# Patient Record
Sex: MALE | Employment: UNEMPLOYED | URBAN - METROPOLITAN AREA
[De-identification: names, ages, dates, MRNs, and addresses within clinical notes are randomized per-mention and may not be internally consistent; named-entity substitution may affect disease eponyms.]

---

## 2017-09-27 PROBLEM — Z99.2: Status: ACTIVE | Noted: 2017-09-27

## 2017-09-27 PROBLEM — I12.0 HYPERTENSIVE CKD, ESRD ON DIALYSIS (HCC): Status: ACTIVE | Noted: 2017-09-27

## 2017-09-27 PROBLEM — N18.6 HYPERTENSIVE CKD, ESRD ON DIALYSIS (HCC): Status: ACTIVE | Noted: 2017-09-27

## 2017-09-27 PROBLEM — Z99.2 HYPERTENSIVE CKD, ESRD ON DIALYSIS (HCC): Status: ACTIVE | Noted: 2017-09-27

## 2018-05-01 ENCOUNTER — OFFICE VISIT (OUTPATIENT)
Dept: INFECTIOUS DISEASES | Age: 78
End: 2018-05-01
Payer: MEDICARE

## 2018-05-01 VITALS
OXYGEN SATURATION: 94 % | DIASTOLIC BLOOD PRESSURE: 43 MMHG | WEIGHT: 248 LBS | TEMPERATURE: 98 F | RESPIRATION RATE: 16 BRPM | HEIGHT: 71 IN | BODY MASS INDEX: 34.72 KG/M2 | HEART RATE: 58 BPM | SYSTOLIC BLOOD PRESSURE: 136 MMHG

## 2018-05-01 DIAGNOSIS — J44.9 CHRONIC OBSTRUCTIVE PULMONARY DISEASE, UNSPECIFIED COPD TYPE (HCC): ICD-10-CM

## 2018-05-01 DIAGNOSIS — N18.6 HYPERTENSIVE CKD, ESRD ON DIALYSIS (HCC): ICD-10-CM

## 2018-05-01 DIAGNOSIS — I12.0 HYPERTENSIVE CKD, ESRD ON DIALYSIS (HCC): ICD-10-CM

## 2018-05-01 DIAGNOSIS — Z99.2 HYPERTENSIVE CKD, ESRD ON DIALYSIS (HCC): ICD-10-CM

## 2018-05-01 DIAGNOSIS — A41.1: Primary | ICD-10-CM

## 2018-05-01 PROCEDURE — G8417 CALC BMI ABV UP PARAM F/U: HCPCS | Performed by: INTERNAL MEDICINE

## 2018-05-01 PROCEDURE — 1123F ACP DISCUSS/DSCN MKR DOCD: CPT | Performed by: INTERNAL MEDICINE

## 2018-05-01 PROCEDURE — 99205 OFFICE O/P NEW HI 60 MIN: CPT | Performed by: INTERNAL MEDICINE

## 2018-05-01 PROCEDURE — G8427 DOCREV CUR MEDS BY ELIG CLIN: HCPCS | Performed by: INTERNAL MEDICINE

## 2018-05-01 PROCEDURE — G8926 SPIRO NO PERF OR DOC: HCPCS | Performed by: INTERNAL MEDICINE

## 2018-05-01 PROCEDURE — 4040F PNEUMOC VAC/ADMIN/RCVD: CPT | Performed by: INTERNAL MEDICINE

## 2018-05-01 PROCEDURE — 3023F SPIROM DOC REV: CPT | Performed by: INTERNAL MEDICINE

## 2018-05-01 PROCEDURE — 1036F TOBACCO NON-USER: CPT | Performed by: INTERNAL MEDICINE

## 2018-05-01 RX ORDER — MIDODRINE HYDROCHLORIDE 5 MG/1
5 TABLET ORAL DAILY
COMMUNITY
End: 2020-03-24

## 2018-05-01 RX ORDER — FAMOTIDINE 40 MG/1
40 TABLET, FILM COATED ORAL DAILY
COMMUNITY
End: 2019-08-08 | Stop reason: ALTCHOICE

## 2018-05-01 RX ORDER — LOVASTATIN 20 MG/1
1 TABLET ORAL DAILY
Refills: 10 | COMMUNITY
Start: 2018-04-23

## 2018-05-01 RX ORDER — TAMSULOSIN HYDROCHLORIDE 0.4 MG/1
0.4 CAPSULE ORAL DAILY
COMMUNITY

## 2018-05-01 RX ORDER — HYDRALAZINE HYDROCHLORIDE 25 MG/1
25 TABLET, FILM COATED ORAL 3 TIMES DAILY
COMMUNITY
End: 2019-08-08

## 2018-05-01 RX ORDER — ALBUTEROL SULFATE 90 UG/1
2 AEROSOL, METERED RESPIRATORY (INHALATION) EVERY 6 HOURS PRN
COMMUNITY

## 2018-05-01 RX ORDER — FENOFIBRATE 145 MG/1
1 TABLET, COATED ORAL DAILY
COMMUNITY
Start: 2018-04-09 | End: 2019-02-18

## 2018-05-01 RX ORDER — ASPIRIN 81 MG/1
1 TABLET, CHEWABLE ORAL DAILY
COMMUNITY

## 2018-05-01 RX ORDER — ALFUZOSIN HYDROCHLORIDE 10 MG/1
10 TABLET, EXTENDED RELEASE ORAL DAILY
COMMUNITY
Start: 2017-10-03

## 2018-05-01 RX ORDER — WARFARIN SODIUM 4 MG/1
4 TABLET ORAL
COMMUNITY

## 2018-05-01 RX ORDER — SEVELAMER CARBONATE 800 MG/1
1 TABLET, FILM COATED ORAL
COMMUNITY
End: 2019-08-08

## 2018-05-01 RX ORDER — LEVOCETIRIZINE DIHYDROCHLORIDE 5 MG/1
5 TABLET, FILM COATED ORAL DAILY
COMMUNITY
Start: 2018-01-03

## 2018-05-01 RX ORDER — IPRATROPIUM BROMIDE 42 UG/1
2 SPRAY, METERED NASAL DAILY
COMMUNITY

## 2018-05-01 RX ORDER — MOMETASONE FUROATE 50 UG/1
2 SPRAY, METERED NASAL DAILY
COMMUNITY
End: 2019-08-08

## 2018-05-01 RX ORDER — MONTELUKAST SODIUM 10 MG/1
10 TABLET ORAL NIGHTLY
COMMUNITY

## 2018-07-03 ENCOUNTER — OFFICE VISIT (OUTPATIENT)
Dept: INFECTIOUS DISEASES | Age: 78
End: 2018-07-03
Payer: MEDICARE

## 2018-07-03 VITALS
TEMPERATURE: 96.8 F | HEART RATE: 81 BPM | BODY MASS INDEX: 37.38 KG/M2 | WEIGHT: 267 LBS | DIASTOLIC BLOOD PRESSURE: 69 MMHG | SYSTOLIC BLOOD PRESSURE: 145 MMHG | HEIGHT: 71 IN

## 2018-07-03 DIAGNOSIS — R78.81 COAG NEGATIVE STAPHYLOCOCCUS BACTEREMIA: Primary | ICD-10-CM

## 2018-07-03 DIAGNOSIS — T82.7XXD PACEMAKER INFECTION, SUBSEQUENT ENCOUNTER: ICD-10-CM

## 2018-07-03 DIAGNOSIS — B95.7 COAG NEGATIVE STAPHYLOCOCCUS BACTEREMIA: Primary | ICD-10-CM

## 2018-07-03 DIAGNOSIS — I35.8 ENDOCARDITIS OF AORTIC VALVE: ICD-10-CM

## 2018-07-03 PROCEDURE — 99214 OFFICE O/P EST MOD 30 MIN: CPT | Performed by: INTERNAL MEDICINE

## 2018-07-03 PROCEDURE — 1123F ACP DISCUSS/DSCN MKR DOCD: CPT | Performed by: INTERNAL MEDICINE

## 2018-07-03 PROCEDURE — 1101F PT FALLS ASSESS-DOCD LE1/YR: CPT | Performed by: INTERNAL MEDICINE

## 2018-07-03 PROCEDURE — G8417 CALC BMI ABV UP PARAM F/U: HCPCS | Performed by: INTERNAL MEDICINE

## 2018-07-03 PROCEDURE — 1036F TOBACCO NON-USER: CPT | Performed by: INTERNAL MEDICINE

## 2018-07-03 PROCEDURE — 4040F PNEUMOC VAC/ADMIN/RCVD: CPT | Performed by: INTERNAL MEDICINE

## 2018-07-03 PROCEDURE — G8427 DOCREV CUR MEDS BY ELIG CLIN: HCPCS | Performed by: INTERNAL MEDICINE

## 2018-07-03 NOTE — PROGRESS NOTES
antimicrobial therapy. If those blood cultures come back negative, he will likely be able to go for pacemaker device which is tentatively schedule on August 8. We will follow him post pacer device implantation. Thank you. F/u of : Bacteremia/endocarditis  Chief Complaint   Patient presents with    Frequent Infections     Septicemia post [facility]     Interval History:  Sandrita Han is a 65y.o.-year-old male who was initially admitted on 5/17/2018. Haylee Jones has a medical history significant for anxiety disorder, osteoarthritis, asthma, BPH, congestive heart failure, COPD, chronic back pain, GERD, history of glaucoma, hearing loss, dyslipidemia, obesity, end-stage renal disease on hemodialysis, and status post AV fistula creation.     Mr. Koroma [address] 20. has been admitted since May 17. He was diagnosed with the coagulase-negative Staph/methicillin sensitive Staph septicemia that has been relapsing. Surface echocardiogram showed a mobile density in the damaged aortic valve and his dual-chamber pacemaker was removed on May 18.     The patient went on hemodialysis in September 2017. He has been using a tunneled catheter in his right chest.  He said the catheter was replaced once because it was malfunctioning. He claims that he has had at least 3 bouts of blood stream infection and has been getting vancomycin during dialysis. There was 1 other time he had a positive blood culture although it is not in her system where he was treated with intravenous antibiotics but the catheter was not removed. He had another bloodstream infection in April 21 noted in epic microbiology report. His catheter was removed and luckily at that time his AV fistula was working hands days which his access to these the functioning AV F. he finishes antimicrobial therapy on May 8, 2018. on May 16 he had intermittent fever and was diagnosed to have a relapse of his coagulase-negative Staph septicemia.   As warfarin (COUMADIN) 4 MG tablet, Take 4 mg by mouth, Disp: , Rfl:     LORazepam (ATIVAN) 0.5 MG tablet, Take 0.5 mg by mouth nightly, Disp: , Rfl:     diphenhydrAMINE (BENADRYL) 25 MG capsule, Take 50 mg by mouth every 6 hours as needed for Itching, Allergies or Sleep, Disp: , Rfl:     Past Medical History:   Diagnosis Date    Acute kidney insufficiency     Anxiety     Borderline diabetes     [facility]D [facility] ([facility]     Hyperlipidemia     Hypertension     Insomnia     Positive blood cultures     Staphylococcus Epidermidis. Unknown source        Past Surgical History:   Procedure Laterality Date    CATARACT REMOVAL Bilateral     COLONOSCOPY         Allergies   Allergen Reactions    Penicillins     Zocor [Simvastatin] Rash       History reviewed. No pertinent family history. Social History     Social History    Marital status:      Spouse name: N/A    Number of children: N/A    Years of education: N/A     Social History Main Topics    Smoking status: Former Smoker    Smokeless tobacco: Never Used    Alcohol use No    Drug use: No    Sexual activity: Not Asked     Other Topics Concern    None     Social History Narrative    None         Objective:   BP (!) 145/69 (Site: Left Arm) Comment: pt ok with first reading  Pulse 81   Temp 96.8 °F (36 °C)   Ht 5' 11\" (1.803 m)   Wt 267 lb (121.1 kg)   BMI 37.24 kg/m²   Physical Exam   Constitutional: He is oriented to person, place, and time. He appears well-developed and well-nourished. No distress. HENT:   Head: Normocephalic and atraumatic. Mouth/Throat: No oropharyngeal exudate. Eyes: Pupils are equal, round, and reactive to light. EOM are normal. Right eye exhibits no discharge. Left eye exhibits no discharge. No scleral icterus. Mildly pale conjunctivae. Neck: Neck supple. No JVD present. No tracheal deviation present. Cardiovascular: Normal rate, regular rhythm, normal heart sounds and intact distal pulses.     No murmur Recent imaging studies at [facility] reviewed. Problem List:  Patient Active Problem List   Diagnosis    Encounter for continuous ambulatory peritoneal dialysis ([facility]    Hypertensive CKD, ESRD on dialysis ([facility]             Thank you.     Pretty Membreno MD  7/3/2018  2:50 PM    Infectious Disease Medicine

## 2018-07-03 NOTE — LETTER
once because it was malfunctioning. He claims that he has had at least 3 bouts of blood stream infection and has been getting vancomycin during dialysis. There was 1 other time he had a positive blood culture although it is not in her system where he was treated with intravenous antibiotics but the catheter was not removed. He had another bloodstream infection in April 21 noted in epic microbiology report. His catheter was removed and luckily at that time his AV fistula was working hands days which his access to these the functioning AV F. he finishes antimicrobial therapy on May 8, 2018. on May 16 he had intermittent fever and was diagnosed to have a relapse of his coagulase-negative Staph septicemia. As mentioned, the dual-chamber pacemaker has been explanted   On May 18. Both the atrial and ventricular leads came back with coagulase-negative staph. The isolate was methicillin sensitive. The patient underwent a mitral valve and aortic valve replacement on May 25 by . We discharged him on intravenous cefazolin because isolate was methicillin sensitive. Interval history:    The patient seems to be doing well at this point. He had his aortic valve and mitral valve replaced  On May 25 and his pacemaker explanted on May 18. He has an external pacing device which seems to be not bothering him. Exit site appears to be unremarkable. He has penicillin allergy but he seems to be tolerating the antibiotic with out any problems. He has not developed any skin rash, or any mucosal swelling. He goes to dialysis regularly. He denies having any chest pain or progressive shortness of breath. He does have some shortness of breath along distance walk. His appetite is slowly improving. His strength seems to be improving. We are here to evaluate him for follow-up prior to reimplantation of his pacemaker device. He is finishing up his antibiotics on July 16, 2018. BP (!) 145/69 (Site: Left Arm) Comment: pt ok with first reading  Pulse 81   Temp 96.8 °F (36 °C)   Ht 5' 11\" (1.803 m)   Wt 267 lb (121.1 kg)   BMI 37.24 kg/m²    Physical Exam   Constitutional: He is oriented to person, place, and time. He appears well-developed and well-nourished. No distress. HENT:   Head: Normocephalic and atraumatic. Mouth/Throat: No oropharyngeal exudate. Eyes: Pupils are equal, round, and reactive to light. EOM are normal. Right eye exhibits no discharge. Left eye exhibits no discharge. No scleral icterus. Mildly pale conjunctivae. Neck: Neck supple. No JVD present. No tracheal deviation present. Cardiovascular: Normal rate, regular rhythm, normal heart sounds and intact distal pulses. No murmur heard. Pulmonary/Chest: Effort normal and breath sounds normal. No stridor. He has no wheezes. He has no rales. External pacer device is unremarkable. Dressing intact without any drainage. Sternal wound appears to be well healed and approximated at this point. Abdominal: Soft. Bowel sounds are normal. He exhibits no distension. There is no tenderness. Musculoskeletal: He exhibits no edema. AV fistula with good thrill. Lymphadenopathy:     He has no cervical adenopathy. Neurological: He is alert and oriented to person, place, and time. Skin: He is not diaphoretic. Psychiatric: He has a normal mood and affect. His behavior is normal. Judgment and thought content normal.              Data Review:      Recent blood work and diagnostics at [facility] reviewed. MICRO:  Newer results are available. Click to view them now. Ref Range & Units 5/18/18 2018 5/18/18 2017 5/18/18 2017 5/18/18 2015 5/18/18 2015    Specimen description Y OTHER:  OTHER:  OTHER:  OTHER:  OTHER:    Special request  ATRIAL LEAD  ATRIAL LEAD  ATRIAL LEAD  VENTRICULAR LEAD  VENTRICULAR LEAD     Fungal smear  NO FUNGAL ELEMENTS S... NO FUNGAL ELEMENTS S...

## 2018-09-04 ENCOUNTER — OFFICE VISIT (OUTPATIENT)
Dept: INFECTIOUS DISEASES | Age: 78
End: 2018-09-04
Payer: MEDICARE

## 2018-09-04 VITALS
SYSTOLIC BLOOD PRESSURE: 144 MMHG | TEMPERATURE: 96.7 F | WEIGHT: 215 LBS | HEART RATE: 66 BPM | BODY MASS INDEX: 30.1 KG/M2 | HEIGHT: 71 IN | DIASTOLIC BLOOD PRESSURE: 68 MMHG

## 2018-09-04 DIAGNOSIS — A41.2 STAPHYLOCOCCAL SEPSIS (HCC): ICD-10-CM

## 2018-09-04 DIAGNOSIS — I35.8 ENDOCARDITIS OF AORTIC VALVE: Primary | ICD-10-CM

## 2018-09-04 PROCEDURE — 99213 OFFICE O/P EST LOW 20 MIN: CPT | Performed by: INTERNAL MEDICINE

## 2018-09-04 PROCEDURE — 1123F ACP DISCUSS/DSCN MKR DOCD: CPT | Performed by: INTERNAL MEDICINE

## 2018-09-04 PROCEDURE — 1036F TOBACCO NON-USER: CPT | Performed by: INTERNAL MEDICINE

## 2018-09-04 PROCEDURE — G8428 CUR MEDS NOT DOCUMENT: HCPCS | Performed by: INTERNAL MEDICINE

## 2018-09-04 PROCEDURE — 1101F PT FALLS ASSESS-DOCD LE1/YR: CPT | Performed by: INTERNAL MEDICINE

## 2018-09-04 PROCEDURE — 4040F PNEUMOC VAC/ADMIN/RCVD: CPT | Performed by: INTERNAL MEDICINE

## 2018-09-04 PROCEDURE — G8417 CALC BMI ABV UP PARAM F/U: HCPCS | Performed by: INTERNAL MEDICINE

## 2018-09-04 NOTE — LETTER
[facility] [address] [place] [address].  [address] [facility],5[address] [address]  Phone: [phone]  Fax: [phone]    PCP: Myriam BARRIOS providers:  Myriam Kirkland MD  [address] [id number]  [facility] [address]  VIA Mail       December 9, 2018       Patient: Shruthi Potter   MR Number: [id number]   YOB: 1940   Date of Visit: 9/4/2018     Dear Aicha Juarez: Thank you for allowing me to see your patient Mr. Shruthi Potetr. Below are the relevant portions of my assessment and plan of care. [facility] INFECTIOUS DISEASES  Follow-Up Note      Patient's Name: Shruthi Potter  YOB: 1940  Age/Sex: 65 y.o./ male  Physician: Annie Rainey MD  Date of Consult: 9/4/2018          Assessment:     · Aortic valve endocarditis Status post # 25 mm Avalus AVR and #31 most take MVR, left atrial appendage resection May 25, 2018  ·   Postoperative shock resolved  · Methicillin sensitive coagulase-negative Staph septicemia on April 21 initially thought to be related to a dialysis catheter that was removed.  Allegedly, he had another blood from infection of the same organism several months prior to the infection in April 21.  · Recurrent/intermittent fever with relapse of methicillin sensitive Staph septicemia  on May 16, 2018  · Coag negative staph and bacillus species positive culture for atrial lead and ventricular lead. · Status post explantation/removal of RV and RA pacing leads on May 18. ·  Status post reimplantation of a [employer] pacemaker and removal external pacemaker on August 9, 2018. · Status post dual-chamber pacemaker placed in November 2017 for sinus node dysfunction and polymorphic ventricular tachycardia.   · End-stage renal disease on hemodialysis  · Congestive heart failure  · COPD  · GERD  · Obesity  · Dyslipidemia  · Anxiety disorder  · Asthma  · BPH  · Osteoarthritis  · Benign essential tremor  · Paresthesias of both feet unremarkable without any overt infection. He is off antimicrobial therapy. He denies having any chills, fever or sweats. His appetite is improving as well as his energy level. He has occasional shortness of breath on long distance walked but denies having any chest pain or abdominal pain or any diarrhea. Subjective:     Review of Systems   Constitutional: Negative for activity change, chills and fever. HENT: Negative for congestion, facial swelling, hearing loss, rhinorrhea, sore throat, trouble swallowing and voice change. Eyes: Negative for discharge and visual disturbance. Respiratory: Negative for cough and shortness of breath (Only on long distance walked). Cardiovascular: Negative for chest pain and leg swelling. Gastrointestinal: Negative for abdominal distention, abdominal pain, diarrhea and nausea. Genitourinary: Negative for hematuria. Musculoskeletal: Negative for arthralgias, back pain, gait problem, joint swelling, myalgias, neck pain and neck stiffness. Skin: Positive for wound (Recent wound from a pacemaker placemenhealing. ). Allergic/Immunologic: Negative for environmental allergies and food allergies. Neurological: Negative for dizziness, weakness, numbness and headaches. Hematological: Does not bruise/bleed easily.          Current Outpatient Prescriptions:     B Complex-C-Folic Acid (TRIPHROCAPS) 1 MG CAPS, TAKE 1 CAPSULE BY MOUTH ONE TIME A DAY , Disp: 30 capsule, Rfl: 5    B Complex-C-Folic Acid (TRIPHROCAPS) 1 MG CAPS, TAKE 1 CAPSULE BY MOUTH ONE TIME A DAY , Disp: 30 capsule, Rfl: 0    alfuzosin (UROXATRAL) 10 MG extended release tablet, Take 10 mg by mouth daily, Disp: , Rfl:     aspirin 81 MG chewable tablet, Take 1 tablet by mouth daily, Disp: , Rfl:     famotidine (PEPCID) 40 MG tablet, Take 40 mg by mouth daily, Disp: , Rfl:     fenofibrate (TRICOR) 145 MG tablet, Take 1 tablet by mouth daily, Disp: , Rfl:

## 2018-12-09 ASSESSMENT — ENCOUNTER SYMPTOMS
ABDOMINAL DISTENTION: 0
BACK PAIN: 0
FACIAL SWELLING: 0
ABDOMINAL DISTENTION: 0
VOICE CHANGE: 0
NAUSEA: 0
COUGH: 0
SINUS PRESSURE: 0
BACK PAIN: 0
EYE DISCHARGE: 0
TROUBLE SWALLOWING: 0
SORE THROAT: 0
SHORTNESS OF BREATH: 1
SHORTNESS OF BREATH: 0
SINUS PAIN: 0
COUGH: 0
RHINORRHEA: 0
EYE DISCHARGE: 0
ABDOMINAL PAIN: 0
FACIAL SWELLING: 0
VOICE CHANGE: 0
ABDOMINAL PAIN: 0
TROUBLE SWALLOWING: 0
DIARRHEA: 0
SORE THROAT: 0
DIARRHEA: 0
NAUSEA: 0
RHINORRHEA: 0

## 2019-02-05 ENCOUNTER — TELEPHONE (OUTPATIENT)
Dept: GASTROENTEROLOGY | Age: 79
End: 2019-02-05

## 2019-02-06 ENCOUNTER — TELEPHONE (OUTPATIENT)
Dept: GASTROENTEROLOGY | Age: 79
End: 2019-02-06

## 2019-02-18 ENCOUNTER — OFFICE VISIT (OUTPATIENT)
Dept: GASTROENTEROLOGY | Age: 79
End: 2019-02-18
Payer: MEDICARE

## 2019-02-18 VITALS — WEIGHT: 217.5 LBS | BODY MASS INDEX: 30.34 KG/M2 | DIASTOLIC BLOOD PRESSURE: 61 MMHG | SYSTOLIC BLOOD PRESSURE: 133 MMHG

## 2019-02-18 DIAGNOSIS — K63.5 POLYP OF COLON, UNSPECIFIED PART OF COLON, UNSPECIFIED TYPE: Primary | ICD-10-CM

## 2019-02-18 PROCEDURE — 1123F ACP DISCUSS/DSCN MKR DOCD: CPT | Performed by: INTERNAL MEDICINE

## 2019-02-18 PROCEDURE — 4040F PNEUMOC VAC/ADMIN/RCVD: CPT | Performed by: INTERNAL MEDICINE

## 2019-02-18 PROCEDURE — G8427 DOCREV CUR MEDS BY ELIG CLIN: HCPCS | Performed by: INTERNAL MEDICINE

## 2019-02-18 PROCEDURE — 1101F PT FALLS ASSESS-DOCD LE1/YR: CPT | Performed by: INTERNAL MEDICINE

## 2019-02-18 PROCEDURE — 99213 OFFICE O/P EST LOW 20 MIN: CPT | Performed by: INTERNAL MEDICINE

## 2019-02-18 PROCEDURE — G8484 FLU IMMUNIZE NO ADMIN: HCPCS | Performed by: INTERNAL MEDICINE

## 2019-02-18 PROCEDURE — G8417 CALC BMI ABV UP PARAM F/U: HCPCS | Performed by: INTERNAL MEDICINE

## 2019-02-18 PROCEDURE — 1036F TOBACCO NON-USER: CPT | Performed by: INTERNAL MEDICINE

## 2019-02-21 ENCOUNTER — TELEPHONE (OUTPATIENT)
Dept: GASTROENTEROLOGY | Age: 79
End: 2019-02-21

## 2019-02-26 RX ORDER — PEG-3350, SODIUM SULFATE, SODIUM CHLORIDE, POTASSIUM CHLORIDE, SODIUM ASCORBATE AND ASCORBIC ACID 7.5-2.691G
100 KIT ORAL ONCE
Qty: 1 BOTTLE | Refills: 0 | Status: SHIPPED | OUTPATIENT
Start: 2019-02-26 | End: 2019-02-26

## 2019-04-08 ENCOUNTER — TELEPHONE (OUTPATIENT)
Dept: GASTROENTEROLOGY | Age: 79
End: 2019-04-08

## 2019-04-15 ENCOUNTER — ANESTHESIA EVENT (OUTPATIENT)
Dept: OPERATING ROOM | Age: 79
End: 2019-04-15
Payer: MEDICARE

## 2019-04-16 ENCOUNTER — HOSPITAL ENCOUNTER (OUTPATIENT)
Age: 79
Setting detail: OUTPATIENT SURGERY
Discharge: HOME | End: 2019-04-16
Attending: INTERNAL MEDICINE | Admitting: INTERNAL MEDICINE
Payer: MEDICARE

## 2019-04-16 ENCOUNTER — ANESTHESIA (OUTPATIENT)
Dept: OPERATING ROOM | Age: 79
End: 2019-04-16
Payer: MEDICARE

## 2019-04-16 VITALS
DIASTOLIC BLOOD PRESSURE: 60 MMHG | OXYGEN SATURATION: 98 % | RESPIRATION RATE: 23 BRPM | SYSTOLIC BLOOD PRESSURE: 128 MMHG

## 2019-04-16 VITALS
DIASTOLIC BLOOD PRESSURE: 51 MMHG | OXYGEN SATURATION: 98 % | HEIGHT: 71 IN | HEART RATE: 63 BPM | BODY MASS INDEX: 28.64 KG/M2 | TEMPERATURE: 97.7 F | WEIGHT: 204.59 LBS | SYSTOLIC BLOOD PRESSURE: 154 MMHG | RESPIRATION RATE: 16 BRPM

## 2019-04-16 LAB
INR BLD: 1.3
POTASSIUM SERPL-SCNC: 4.4 MMOL/L (ref 3.7–5.3)
PROTHROMBIN TIME: 12.8 SEC (ref 9.7–11.6)

## 2019-04-16 PROCEDURE — 3609010700 HC COLONOSCOPY POLYPECTOMY REMOVAL SNARE/STOMA: Performed by: INTERNAL MEDICINE

## 2019-04-16 PROCEDURE — 84132 ASSAY OF SERUM POTASSIUM: CPT

## 2019-04-16 PROCEDURE — 2709999900 HC NON-CHARGEABLE SUPPLY: Performed by: INTERNAL MEDICINE

## 2019-04-16 PROCEDURE — 7100000011 HC PHASE II RECOVERY - ADDTL 15 MIN: Performed by: INTERNAL MEDICINE

## 2019-04-16 PROCEDURE — 2580000003 HC RX 258: Performed by: ANESTHESIOLOGY

## 2019-04-16 PROCEDURE — 6360000002 HC RX W HCPCS: Performed by: NURSE ANESTHETIST, CERTIFIED REGISTERED

## 2019-04-16 PROCEDURE — 3700000001 HC ADD 15 MINUTES (ANESTHESIA): Performed by: INTERNAL MEDICINE

## 2019-04-16 PROCEDURE — 2500000003 HC RX 250 WO HCPCS: Performed by: ANESTHESIOLOGY

## 2019-04-16 PROCEDURE — 3700000000 HC ANESTHESIA ATTENDED CARE: Performed by: INTERNAL MEDICINE

## 2019-04-16 PROCEDURE — 45385 COLONOSCOPY W/LESION REMOVAL: CPT | Performed by: INTERNAL MEDICINE

## 2019-04-16 PROCEDURE — 88305 TISSUE EXAM BY PATHOLOGIST: CPT

## 2019-04-16 PROCEDURE — 85610 PROTHROMBIN TIME: CPT

## 2019-04-16 PROCEDURE — 7100000010 HC PHASE II RECOVERY - FIRST 15 MIN: Performed by: INTERNAL MEDICINE

## 2019-04-16 PROCEDURE — 45380 COLONOSCOPY AND BIOPSY: CPT | Performed by: INTERNAL MEDICINE

## 2019-04-16 RX ORDER — SODIUM CHLORIDE 0.9 % (FLUSH) 0.9 %
10 SYRINGE (ML) INJECTION PRN
Status: DISCONTINUED | OUTPATIENT
Start: 2019-04-16 | End: 2019-04-16 | Stop reason: HOSPADM

## 2019-04-16 RX ORDER — SODIUM CHLORIDE 0.9 % (FLUSH) 0.9 %
10 SYRINGE (ML) INJECTION EVERY 12 HOURS SCHEDULED
Status: DISCONTINUED | OUTPATIENT
Start: 2019-04-16 | End: 2019-04-16 | Stop reason: HOSPADM

## 2019-04-16 RX ORDER — SODIUM CHLORIDE, SODIUM LACTATE, POTASSIUM CHLORIDE, CALCIUM CHLORIDE 600; 310; 30; 20 MG/100ML; MG/100ML; MG/100ML; MG/100ML
INJECTION, SOLUTION INTRAVENOUS CONTINUOUS
Status: DISCONTINUED | OUTPATIENT
Start: 2019-04-17 | End: 2019-04-16 | Stop reason: HOSPADM

## 2019-04-16 RX ORDER — LIDOCAINE HYDROCHLORIDE 10 MG/ML
1 INJECTION, SOLUTION EPIDURAL; INFILTRATION; INTRACAUDAL; PERINEURAL
Status: COMPLETED | OUTPATIENT
Start: 2019-04-16 | End: 2019-04-16

## 2019-04-16 RX ORDER — SODIUM CHLORIDE 9 MG/ML
INJECTION, SOLUTION INTRAVENOUS CONTINUOUS
Status: DISCONTINUED | OUTPATIENT
Start: 2019-04-17 | End: 2019-04-16 | Stop reason: HOSPADM

## 2019-04-16 RX ORDER — PROPOFOL 10 MG/ML
INJECTION, EMULSION INTRAVENOUS PRN
Status: DISCONTINUED | OUTPATIENT
Start: 2019-04-16 | End: 2019-04-16 | Stop reason: SDUPTHER

## 2019-04-16 RX ADMIN — PROPOFOL 40 MG: 10 INJECTION, EMULSION INTRAVENOUS at 09:04

## 2019-04-16 RX ADMIN — PROPOFOL 30 MG: 10 INJECTION, EMULSION INTRAVENOUS at 09:07

## 2019-04-16 RX ADMIN — PROPOFOL 40 MG: 10 INJECTION, EMULSION INTRAVENOUS at 09:00

## 2019-04-16 RX ADMIN — PROPOFOL 20 MG: 10 INJECTION, EMULSION INTRAVENOUS at 08:48

## 2019-04-16 RX ADMIN — PROPOFOL 40 MG: 10 INJECTION, EMULSION INTRAVENOUS at 08:55

## 2019-04-16 RX ADMIN — PROPOFOL 50 MG: 10 INJECTION, EMULSION INTRAVENOUS at 08:41

## 2019-04-16 RX ADMIN — PROPOFOL 20 MG: 10 INJECTION, EMULSION INTRAVENOUS at 08:57

## 2019-04-16 RX ADMIN — PROPOFOL 30 MG: 10 INJECTION, EMULSION INTRAVENOUS at 08:53

## 2019-04-16 RX ADMIN — PROPOFOL 20 MG: 10 INJECTION, EMULSION INTRAVENOUS at 08:52

## 2019-04-16 RX ADMIN — LIDOCAINE HYDROCHLORIDE 50 ML: 10 INJECTION, SOLUTION EPIDURAL; INFILTRATION; INTRACAUDAL; PERINEURAL at 08:39

## 2019-04-16 RX ADMIN — SODIUM CHLORIDE: 9 INJECTION, SOLUTION INTRAVENOUS at 07:24

## 2019-04-16 RX ADMIN — PROPOFOL 20 MG: 10 INJECTION, EMULSION INTRAVENOUS at 08:50

## 2019-04-16 RX ADMIN — PROPOFOL 20 MG: 10 INJECTION, EMULSION INTRAVENOUS at 08:43

## 2019-04-16 RX ADMIN — PROPOFOL 50 MG: 10 INJECTION, EMULSION INTRAVENOUS at 08:39

## 2019-04-16 RX ADMIN — PROPOFOL 20 MG: 10 INJECTION, EMULSION INTRAVENOUS at 08:45

## 2019-04-16 ASSESSMENT — PULMONARY FUNCTION TESTS
PIF_VALUE: 1
PIF_VALUE: 0
PIF_VALUE: 1

## 2019-04-16 ASSESSMENT — PAIN SCALES - GENERAL
PAINLEVEL_OUTOF10: 0

## 2019-04-16 ASSESSMENT — PAIN - FUNCTIONAL ASSESSMENT: PAIN_FUNCTIONAL_ASSESSMENT: 0-10

## 2019-04-16 NOTE — OP NOTE
[facility]     PROCEDURE DATE: 04/16/19    REFERRING PHYSICIAN: No ref. provider found     PRIMARY CARE PROVIDER: Hakan Gandhi    ATTENDING PHYSICIAN: Jazmín Solitario MD     HISTORY: Mr. Leora Huff is a 77 y.o. male who presents to the [place] [address] Endoscopy unit for colonoscopy. The patient's clinical history is remarkable for colon polyps. He is currently medically stable and appropriate for the planned procedure. PREOPERATIVE DIAGNOSIS: previous adenomatous polyp. PROCEDURES:   Transanal Colonoscopy with polypectomy (cold snare), polypectomy (snare cautery), polypectomy (cold biopsy). POSTPROCEDURE DIAGNOSIS:  15 polyps- removed  Moderate diverticulosis    MEDICATIONS:     MAC per anesthesia    EBL: minimal    INSTRUMENT: Olympus PCF-H190 AL flexible Colonoscope. PREPARATION: The nature and character of the procedure as well as risks, benefits, and alternatives were discussed with the patient and informed consent was obtained. Complications were said to include, but were not limited to: medication allergy, medication reaction, cardiovascular and respiratory problems, bleeding, perforation, infection, and/or missed diagnosis. Following arrival in the endoscopy room, the patient was placed in the left lateral decubitus position and final time-out accomplished in the presence of the nursing staff. Baseline vital signs were obtained and reviewed, and IV sedation was subsequently initiated. FINDINGS: Rectal examination demonstrated no significant visible external abnormality and digital palpation was unremarkable. Following adequate conscious sedation the colonoscope was introduced and advanced under direct visualization to the cecum, which was identified by the ileocecal valve and appendiceal orifice. The bowel preparation was felt to be mostly adequate. This included small amounts of thick stool that mostly was able to be adequately irrigated and aspirated. Cecal intubation time was 4 minutes. Once maximally inserted, the endoscope was withdrawn and the mucosa was carefully inspected. The mucosal exam was normal. Moderate left sided diverticulosis. 4 polyps (5-7 mm) were removed from ascending colon by hot snare and one (5 mm) by cold snare. 1 polyp (4 mm) was removed from transverse colon by cold biopsy forceps. 4 polyps (5-7 mm) were removed from transverse colon by hot snare. 5 polyps (5-7 mm) were removed from descending colon by cold snare. Retroflexion was performed in the rectum and showed no pathology. Withdrawal time was 30 minutes. RECOMMENDATIONS:   1) Follow up with referring provider, as previously scheduled. 2) Follow up on pathology report. 3) Continue interval colon cancer surveillance (i.e repeat colonoscopy in 6 months)  4) Hold coumadin for 1 week. 5) Genetic testing.           Wilberto Massey

## 2019-04-16 NOTE — ANESTHESIA POSTPROCEDURE EVALUATION
Department of Anesthesiology  Postprocedure Note    Patient: Thelma Razo  MRN: [id number]  YOB: 1940  Date of evaluation: 4/16/2019  Time:  11:21 AM     Procedure Summary     Date:  04/16/19 Room / Location:  [facility] / [facility] OR    Anesthesia Start:  0833 Anesthesia Stop:  4938    Procedure:  COLONOSCOPY POLYPECTOMY REMOVAL HOT SNARE (N/A ) Diagnosis:  (DX COLON POLYP)    Surgeon:  Deena Platt MD Responsible Provider:  Miguel A Castano DO    Anesthesia Type:  MAC ASA Status:  4          Anesthesia Type: MAC    Benjamin Phase I: Benjamin Score: 10    Benjamin Phase II: Benjamin Score: 8    Last vitals: Reviewed and per EMR flowsheets.        Anesthesia Post Evaluation    Patient location during evaluation: PACU  Patient participation: complete - patient participated  Level of consciousness: awake and alert  Airway patency: patent  Nausea & Vomiting: no nausea and no vomiting  Complications: no  Cardiovascular status: hemodynamically stable  Respiratory status: acceptable  Hydration status: stable

## 2019-04-16 NOTE — ANESTHESIA PRE PROCEDURE
for Wheezing   Yes Historical Provider, MD   sevelamer (RENVELA) 800 MG tablet Take 1 tablet by mouth 3 times daily (with meals)   Yes Historical Provider, MD   tamsulosin (FLOMAX) 0.4 MG capsule Take 0.4 mg by mouth daily   Yes Historical Provider, MD   diphenhydrAMINE (BENADRYL) 25 MG capsule Take 50 mg by mouth every 6 hours as needed for Itching, Allergies or Sleep   Yes Historical Provider, MD   aspirin 81 MG chewable tablet Take 1 tablet by mouth daily    Historical Provider, MD   warfarin (COUMADIN) 4 MG tablet Take 4 mg by mouth    Historical Provider, MD   LORazepam (ATIVAN) 0.5 MG tablet Take 0.5 mg by mouth nightly    Historical Provider, MD       Current medications:    Current Facility-Administered Medications   Medication Dose Route Frequency Provider Last Rate Last Dose    [START ON 4/17/2019] 0.9 % sodium chloride infusion   Intravenous Continuous Charity Lopes  mL/hr at 04/16/19 0724      [START ON 4/17/2019] lactated ringers infusion   Intravenous Continuous Charity Lopes MD        sodium chloride flush 0.9 % injection 10 mL  10 mL Intravenous 2 times per day Mayito Mcfarland MD        sodium chloride flush 0.9 % injection 10 mL  10 mL Intravenous PRN Charity Lopes MD        lidocaine PF 1 % injection 1 mL  1 mL Intradermal Once PRN Mayito Mcfarland MD           Allergies: Allergies   Allergen Reactions    Penicillins     Zocor [Simvastatin] Rash       Problem List:    Patient Active Problem List   Diagnosis Code    Encounter for continuous ambulatory peritoneal dialysis ([facility] Z99.2    Hypertensive CKD, ESRD on dialysis ([facility] I12.0, N18.6, Z99.2       Past Medical History:        Diagnosis Date    Acute kidney insufficiency     Anxiety     Borderline diabetes     [facility]     Hemodialysis patient ([facility]     M-W-F dialysis    Hyperlipidemia     Hypertension     Insomnia     Positive blood cultures     Staphylococcus Epidermidis.  Unknown source        Past Surgical History:        Procedure Laterality Date    CARDIAC SURGERY      2 valves replaced    CATARACT REMOVAL Bilateral     COLONOSCOPY      PACEMAKER PLACEMENT         Social History:    Social History     Tobacco Use    Smoking status: Former Smoker    Smokeless tobacco: Never Used   Substance Use Topics    Alcohol use: No                                Counseling given: Not Answered      Vital Signs (Current):   Vitals:    04/16/19 0658 04/16/19 0701   BP:  (!) 133/46   Pulse:  74   Resp:  18   Temp:  97.7 °F (36.5 °C)   TempSrc:  Oral   SpO2:  98%   Weight: 204 lb 9.4 oz (92.8 kg)    Height: 5' 11\" (1.803 m)                                               BP Readings from Last 3 Encounters:   04/16/19 (!) 133/46   02/18/19 133/61   09/04/18 (!) 144/68       NPO Status: Time of last liquid consumption: 1800                        Time of last solid consumption: 1900                        Date of last liquid consumption: 04/15/19                        Date of last solid food consumption: 04/14/19    BMI:   Wt Readings from Last 3 Encounters:   04/16/19 204 lb 9.4 oz (92.8 kg)   02/18/19 217 lb 8 oz (98.7 kg)   09/04/18 215 lb (97.5 kg)     Body mass index is 28.53 kg/m². CBC: No results found for: WBC, RBC, HGB, HCT, MCV, RDW, PLT    CMP:   Lab Results   Component Value Date    K 4.4 04/16/2019       POC Tests: No results for input(s): POCGLU, POCNA, POCK, POCCL, POCBUN, POCHEMO, POCHCT in the last 72 hours.     Coags:   Lab Results   Component Value Date    PROTIME 12.8 04/16/2019    INR 1.3 04/16/2019       HCG (If Applicable): No results found for: PREGTESTUR, PREGSERUM, HCG, HCGQUANT     ABGs: No results found for: PHART, PO2ART, LGH8KEZ, EIK9LXB, BEART, U0CFHNRG     Type & Screen (If Applicable):  No results found for: [facility]    Anesthesia Evaluation  Patient summary reviewed and Nursing notes reviewed no history of anesthetic complications:   Airway: Mallampati: II  TM distance: >3 FB Neck ROM: full  Mouth opening: > = 3 FB Dental:          Pulmonary:normal exam                               Cardiovascular:  Exercise tolerance: no interval change,   (+) hypertension:, pacemaker: no interval change, dysrhythmias: atrial fibrillation, hyperlipidemia          Rate: normal                    Neuro/Psych:               GI/Hepatic/Renal:   (+) renal disease: ESRD and dialysis,           Endo/Other:                     Abdominal:           Vascular:                                        Anesthesia Plan      MAC     ASA 4       Induction: intravenous. Anesthetic plan and risks discussed with patient. Plan discussed with CRNA.     Attending anesthesiologist reviewed and agrees with Pre Eval content        Pacer recent interrogation 2 weeks ago per patient no issues  Device card on chart no ICD  S/p AVR MVR          Giovanna Frazier DO   4/16/2019

## 2019-04-16 NOTE — H&P
History and Physical Service   [place] [address]    HISTORY AND PHYSICAL EXAMINATION            Date of Evaluation: 4/16/2019  Patient name:  Frank Dias  MRN:   [id number]  YOB: 1940  PCP:    Rosalee Roman    History Obtained From:     Patient, medical records    History of Present Illness: This is Frank Dias a 77 y.o. male who presents today for a  screening colonoscopy. His last colonoscopy was 2 months ago and due to so many colon polyps he needed to return to have the rest removed. He has had recent small amount of rectal bleeding that was bright red in color this past January that resolved after his colonoscopy in February. He has a history of having multiple colon polyps removed with previous colonoscopies. The patient denies any abdominal pain, nausea, vomiting, diarrhea, constipation. No dark stools. No weight loss. No family history of colon cancer. He is on Coumadin for atrial fibrillation with last dose of Coumadin 5 days ago and also has a pacemaker. He also has renal failure and is currently on dialysis on M, W, F and reports he did have dialysis yesterday. He has a recent diagnosis of vascular insufficiency and is scheduled to have a femoral popliteal bypass by Dr. Rekha Brunner on 4/29/19. He is currently being treated for an ischemic ulcer of the right calf and between 2nd and 3rd left toes            Past Medical History:     Past Medical History:   Diagnosis Date    Acute kidney insufficiency     Anxiety     Borderline diabetes     ESRD on dialysis ([facility] [address]     Hemodialysis patient ([facility]     M-W-F dialysis    Hyperlipidemia     Hypertension     Insomnia     Positive blood cultures     Staphylococcus Epidermidis.  Unknown source         Past Surgical History:     Past Surgical History:   Procedure Laterality Date    CATARACT REMOVAL Bilateral     COLONOSCOPY          Medications Prior to Admission:     Prior to Admission medications    Medication Sig Start Date End Date Taking?  Authorizing Provider   Calcium Acetate, Phos Binder, 667 MG CAPS TAKE 2 CAPSULES BY MOUTH WITH EACH MEAL 4/15/19  Yes MD CHRISTA Ag Complex-C-Folic Acid (TRIPHROCAPS) 1 MG CAPS TAKE 1 CAPSULE BY MOUTH ONE TIME A DAY  3/22/19  Yes MD CHRISTA Ag Complex-C-Folic Acid (TRIPHROCAPS) 1 MG CAPS TAKE 1 CAPSULE BY MOUTH ONE TIME A DAY  3/18/19  Yes MD CHRISTA Ag Complex-C-Folic Acid (TRIPHROCAPS) 1 MG CAPS TAKE 1 CAPSULE BY MOUTH ONE TIME A DAY  5/18/18  Yes Dianne Kingsley MD   alfuzosin (UROXATRAL) 10 MG extended release tablet Take 10 mg by mouth daily 10/3/17  Yes Historical Provider, MD   famotidine (PEPCID) 40 MG tablet Take 40 mg by mouth daily   Yes Historical Provider, MD   hydrALAZINE (APRESOLINE) 25 MG tablet Take 25 mg by mouth 3 times daily   Yes Historical Provider, MD   ipratropium (ATROVENT) 0.06 % nasal spray 2 sprays by Nasal route daily   Yes Historical Provider, MD   levocetirizine (XYZAL) 5 MG tablet Take 5 mg by mouth daily 1/3/18  Yes Historical Provider, MD   lovastatin (MEVACOR) 20 MG tablet Take 1 tablet by mouth daily 4/23/18  Yes Historical Provider, MD   midodrine (PROAMATINE) 5 MG tablet Take 5 mg by mouth daily   Yes Historical Provider, MD   mometasone (NASONEX) 50 MCG/ACT nasal spray 2 sprays by Nasal route daily   Yes Historical Provider, MD   montelukast (SINGULAIR) 10 MG tablet Take 10 mg by mouth nightly   Yes Historical Provider, MD   albuterol sulfate  (90 Base) MCG/ACT inhaler Inhale 2 puffs into the lungs every 6 hours as needed for Wheezing   Yes Historical Provider, MD   sevelamer (RENVELA) 800 MG tablet Take 1 tablet by mouth 3 times daily (with meals)   Yes Historical Provider, MD   tamsulosin (FLOMAX) 0.4 MG capsule Take 0.4 mg by mouth daily   Yes Historical Provider, MD   diphenhydrAMINE (BENADRYL) 25 MG capsule Take 50 mg by mouth every 6 hours as needed for Itching, Allergies or Sleep   Yes

## 2019-04-18 LAB — SURGICAL PATHOLOGY REPORT: NORMAL

## 2019-04-25 ENCOUNTER — TELEPHONE (OUTPATIENT)
Dept: GASTROENTEROLOGY | Age: 79
End: 2019-04-25

## 2019-04-25 NOTE — TELEPHONE ENCOUNTER
Patient's wife called stating that Dr Constanza Reardon had stated that patient would need another colonoscopy in 6 months and some follow up lab work. Wife is wanting to know if this is correct. Please advise wife.

## 2019-04-26 NOTE — TELEPHONE ENCOUNTER
Patient is to repeat the colonoscopy in 6 months due to patient having many polyps removed. Also will be getting him scheduled with Genetic testing.

## 2019-05-10 ENCOUNTER — TELEPHONE (OUTPATIENT)
Dept: GASTROENTEROLOGY | Age: 79
End: 2019-05-10

## 2019-05-10 NOTE — TELEPHONE ENCOUNTER
LVM confirming 5/13/19 Ty thania't. 15 min early, photo ID, ins card, co-pay, 24 hour notice on cancel, if n/s, up to provider if we'll r/s.

## 2019-05-13 ENCOUNTER — TELEPHONE (OUTPATIENT)
Dept: GASTROENTEROLOGY | Age: 79
End: 2019-05-13

## 2019-07-15 ENCOUNTER — TELEPHONE (OUTPATIENT)
Dept: INFECTIOUS DISEASES | Age: 79
End: 2019-07-15

## 2019-07-22 ENCOUNTER — TELEPHONE (OUTPATIENT)
Dept: INFECTIOUS DISEASES | Age: 79
End: 2019-07-22

## 2019-07-31 ENCOUNTER — TELEPHONE (OUTPATIENT)
Dept: INFECTIOUS DISEASES | Age: 79
End: 2019-07-31

## 2019-07-31 NOTE — TELEPHONE ENCOUNTER
Carmel Vivar MD 7/31/2019 2:27 PM     Dr Fausto Huerta, I received a random vanco level on [facility] ([birth year]) from 7/29/19, it was 20.6. Please call Corby Nolen with any new orders at [phone].  Thanks, Justyna     Read 7/31/2019 2:27 PM 7/31/2019 2:29 PM     I forward to dr Daphnie Lunsford

## 2019-08-02 ENCOUNTER — TELEPHONE (OUTPATIENT)
Dept: GASTROENTEROLOGY | Age: 79
End: 2019-08-02

## 2019-08-05 ENCOUNTER — OFFICE VISIT (OUTPATIENT)
Dept: GASTROENTEROLOGY | Age: 79
End: 2019-08-05

## 2019-08-05 VITALS
BODY MASS INDEX: 27.34 KG/M2 | WEIGHT: 196 LBS | SYSTOLIC BLOOD PRESSURE: 110 MMHG | DIASTOLIC BLOOD PRESSURE: 64 MMHG | HEART RATE: 62 BPM

## 2019-08-05 DIAGNOSIS — Z86.010 HISTORY OF COLON POLYPS: Primary | ICD-10-CM

## 2019-08-05 PROCEDURE — 99999 PR OFFICE/OUTPT VISIT,PROCEDURE ONLY: CPT | Performed by: INTERNAL MEDICINE

## 2019-08-08 ENCOUNTER — OFFICE VISIT (OUTPATIENT)
Dept: INFECTIOUS DISEASES | Age: 79
End: 2019-08-08
Payer: MEDICARE

## 2019-08-08 VITALS
HEART RATE: 62 BPM | WEIGHT: 198 LBS | TEMPERATURE: 97 F | BODY MASS INDEX: 26.82 KG/M2 | DIASTOLIC BLOOD PRESSURE: 65 MMHG | SYSTOLIC BLOOD PRESSURE: 131 MMHG | OXYGEN SATURATION: 85 % | HEIGHT: 72 IN

## 2019-08-08 DIAGNOSIS — S81.802D OPEN WOUND OF LEFT LOWER LEG, SUBSEQUENT ENCOUNTER: Primary | ICD-10-CM

## 2019-08-08 PROCEDURE — 4040F PNEUMOC VAC/ADMIN/RCVD: CPT | Performed by: INTERNAL MEDICINE

## 2019-08-08 PROCEDURE — G8417 CALC BMI ABV UP PARAM F/U: HCPCS | Performed by: INTERNAL MEDICINE

## 2019-08-08 PROCEDURE — 99213 OFFICE O/P EST LOW 20 MIN: CPT | Performed by: INTERNAL MEDICINE

## 2019-08-08 PROCEDURE — 1036F TOBACCO NON-USER: CPT | Performed by: INTERNAL MEDICINE

## 2019-08-08 PROCEDURE — 1123F ACP DISCUSS/DSCN MKR DOCD: CPT | Performed by: INTERNAL MEDICINE

## 2019-08-08 PROCEDURE — G8427 DOCREV CUR MEDS BY ELIG CLIN: HCPCS | Performed by: INTERNAL MEDICINE

## 2019-08-08 RX ORDER — FLUTICASONE FUROATE AND VILANTEROL TRIFENATATE 100; 25 UG/1; UG/1
POWDER RESPIRATORY (INHALATION)
Refills: 6 | COMMUNITY
Start: 2019-05-18

## 2019-08-08 RX ORDER — BENZONATATE 100 MG/1
CAPSULE ORAL
Refills: 0 | COMMUNITY
Start: 2019-06-02

## 2019-08-08 RX ORDER — CITALOPRAM 10 MG/1
TABLET ORAL
Refills: 4 | COMMUNITY
Start: 2019-07-17

## 2019-08-08 RX ORDER — ZOLPIDEM TARTRATE 10 MG/1
TABLET ORAL
Refills: 2 | COMMUNITY
Start: 2019-07-23

## 2019-08-08 RX ORDER — AZELASTINE HYDROCHLORIDE 137 UG/1
SPRAY, METERED NASAL
Refills: 0 | COMMUNITY
Start: 2019-06-29

## 2019-08-08 RX ORDER — UMECLIDINIUM 62.5 UG/1
AEROSOL, POWDER ORAL
Refills: 6 | COMMUNITY
Start: 2019-05-18

## 2019-08-08 RX ORDER — FLUTICASONE FUROATE, UMECLIDINIUM BROMIDE AND VILANTEROL TRIFENATATE 100; 62.5; 25 UG/1; UG/1; UG/1
POWDER RESPIRATORY (INHALATION)
Refills: 6 | COMMUNITY
Start: 2019-06-21

## 2019-08-15 DIAGNOSIS — S81.802D OPEN WOUND OF LEFT LOWER LEG, SUBSEQUENT ENCOUNTER: ICD-10-CM

## 2019-08-16 ENCOUNTER — TELEPHONE (OUTPATIENT)
Dept: INFECTIOUS DISEASES | Age: 79
End: 2019-08-16

## 2019-08-26 ENCOUNTER — TELEPHONE (OUTPATIENT)
Dept: GASTROENTEROLOGY | Age: 79
End: 2019-08-26

## 2019-09-11 ENCOUNTER — TELEPHONE (OUTPATIENT)
Dept: INFECTIOUS DISEASES | Age: 79
End: 2019-09-11

## 2019-09-25 ENCOUNTER — OFFICE VISIT (OUTPATIENT)
Dept: INFECTIOUS DISEASES | Age: 79
End: 2019-09-25
Payer: MEDICARE

## 2019-09-25 VITALS
OXYGEN SATURATION: 94 % | SYSTOLIC BLOOD PRESSURE: 126 MMHG | HEART RATE: 62 BPM | HEIGHT: 72 IN | DIASTOLIC BLOOD PRESSURE: 54 MMHG | WEIGHT: 172 LBS | RESPIRATION RATE: 16 BRPM | BODY MASS INDEX: 23.3 KG/M2

## 2019-09-25 DIAGNOSIS — I38 ENDOCARDITIS, UNSPECIFIED CHRONICITY, UNSPECIFIED ENDOCARDITIS TYPE: Primary | ICD-10-CM

## 2019-09-25 PROCEDURE — G8428 CUR MEDS NOT DOCUMENT: HCPCS | Performed by: INTERNAL MEDICINE

## 2019-09-25 PROCEDURE — G8420 CALC BMI NORM PARAMETERS: HCPCS | Performed by: INTERNAL MEDICINE

## 2019-09-25 PROCEDURE — 1123F ACP DISCUSS/DSCN MKR DOCD: CPT | Performed by: INTERNAL MEDICINE

## 2019-09-25 PROCEDURE — 4040F PNEUMOC VAC/ADMIN/RCVD: CPT | Performed by: INTERNAL MEDICINE

## 2019-09-25 PROCEDURE — 1036F TOBACCO NON-USER: CPT | Performed by: INTERNAL MEDICINE

## 2019-09-25 PROCEDURE — 99213 OFFICE O/P EST LOW 20 MIN: CPT | Performed by: INTERNAL MEDICINE

## 2019-09-25 NOTE — PROGRESS NOTES
[facility]    Follow-up NOTE           Visit date: 9/25/2019      Reason for visit /chief complaints   Endocarditis    Assessment:      Diagnosis Orders   1. Endocarditis, unspecified chronicity, unspecified endocarditis type         Left foot gangrene  · Mitral valve vegetation  · Echo densities on mitral valve prosthesis on surface echo  · Encephalopathy, likely toxic metabolic  · Bilateral pleural effusions with pulmonary edema  · Acute hypoxic respiratory failure  · Recent amputation of the left 3rd toe at the level of the proximal phalanx 06/25/2019  ? biopsy positive for osteomyelitis  ? culture with Pseudomonas, group B strep, coag-negative staph  · Ischemic ulcer of the left foot at site of toe amputation  · CHF  · COPD  · History of aortic valve endocarditis with mitral valve replacement, atrial appendage resection 05/25/2018. · ESRD on HD  · Peripheral vascular disease  · Atrial fibrillation            Plan:   Plan is to continue Vanco and cefepime for 2 more weeks to give 6 weeks treatment until October 11  Recommend to check PT/INR every other day while on antibiotic, defer to the Nursing home doctor to order it   he will need amputation of the gangrenous part of the left foot defer to the vascular  Check the WBC, platelets LFTs in 1 week  ID clinic in 2 weeks    HPI:    62-year-old male came for follow-up after discharge from [facility].  He was hospitalized last month and was diagnosed with prostatic  mitral valve vegetation   He was discharged on Vanco and cefepime  He having issues with gangrene on the left foot and being followed by vascular. He denies any fever chills. No cough or shortness of breath. No vomiting or diarrhea    No fever chills. No vomiting or diarrhea.     Past Medical History:   Diagnosis Date    Acute kidney insufficiency     Anxiety     Borderline diabetes     Encounter for continuous ambulatory peritoneal dialysis ([facility] [address] 9/27/2017    ESRD on dialysis [facility])     Hemodialysis patient [facility])     M-W-F dialysis    Hyperlipidemia     Hypertension     Hypertensive CKD, ESRD on dialysis ([facility] [address] 9/27/2017    Insomnia     Positive blood cultures     Staphylococcus Epidermidis. Unknown source      Past Surgical History:   Procedure Laterality Date    CARDIAC SURGERY      2 valves replaced    CATARACT REMOVAL Bilateral     COLONOSCOPY  04/16/2019    polypectomy, 15 removed    PACEMAKER PLACEMENT       Social History     Socioeconomic History    Marital status:      Spouse name: None    Number of children: None    Years of education: None    Highest education level: None   Occupational History    None   Social Needs    Financial resource strain: None    Food insecurity:     Worry: None     Inability: None    Transportation needs:     Medical: None     Non-medical: None   Tobacco Use    Smoking status: Former Smoker    Smokeless tobacco: Never Used   Substance and Sexual Activity    Alcohol use: No    Drug use: No    Sexual activity: None   Lifestyle    Physical activity:     Days per week: None     Minutes per session: None    Stress: None   Relationships    Social connections:     Talks on phone: None     Gets together: None     Attends [employer] service: None     Active member of club or organization: None     Attends meetings of clubs or organizations: None     Relationship status: None    Intimate partner violence:     Fear of current or ex partner: None     Emotionally abused: None     Physically abused: None     Forced sexual activity: None   Other Topics Concern    None   Social History Narrative    None     No family history on file.     Current med     Current Outpatient Medications:     Azelastine HCl 137 MCG/SPRAY SOLN, INSTILL 2 SPRAYS IN EACH NOSTRIL TWICE DAILY, Disp: , Rfl: 0    benzonatate (TESSALON) 100 MG capsule, TAKE 1 CAPSULE BY MOUTH THREE TIMES A DAY AS NEEDED FOR COUGH, Disp: , Rfl: 0    citalopram (CELEXA) negative  GENITOURINARY:  negative  INTEGUMENT/BREAST:  negative  HEMATOLOGIC/LYMPHATIC:  negative  ALLERGIC/IMMUNOLOGIC:  negative  ENDOCRINE:  negative  MUSCULOSKELETAL:   left foot discoloration  NEUROLOGICAL:  negative  BEHAVIOR/PSYCH:  negative    BP (!) 126/54 (Site: Left Upper Arm, Position: Sitting)   Pulse 62   Resp 16   Ht 6' (1.829 m)   Wt 172 lb (78 kg)   SpO2 94%   BMI 23.33 kg/m²       EXAM:  CONSTITUTIONAL:  awake, alert, cooperative, no apparent distress,  EYES: conjunctiva normal  ENT:  Normocephalic, without obvious abnormality, atraumatic,  LUNGS:  No increased work of breathing, good air exchange, clear to auscultation bilaterally, no crackles or wheezing  CARDIOVASCULAR:  regular rate and rhythm, normal S1 and S2,  no murmur noted  ABDOMEN:   normal bowel sounds, soft, non-distended, non-tender, no masses palpated, no hepatosplenomegally,   MUSCULOSKELETAL: Left foot gangrene   NEUROLOGIC:  Awake, alert, oriented to name, place and time  SKIN:  No rash      Data Review:    CBC with Diff  Results in Past 30 Days  Result Component Current Result Ref Range Previous Result Ref Range   Hemoglobin 11.9 (A) (9/4/2019) 13.5 - 17.5 g/dL Not in Time Range        Reviewed    IMAGING:          Vaishali Garcia MD  9/25/2019  1:32 PM      This note was completed using a voice transcription system. Every effort was made to ensure accuracy. However, inadvertent computerized transcription errors may be present.

## 2020-05-08 ENCOUNTER — HOSPITAL ENCOUNTER (OUTPATIENT)
Age: 80
Setting detail: SPECIMEN
Discharge: HOME | End: 2020-05-08
Payer: MEDICARE

## 2020-05-08 LAB
-: ABNORMAL
AMORPHOUS: ABNORMAL
BACTERIA: ABNORMAL
BILIRUBIN URINE: ABNORMAL
CASTS UA: ABNORMAL /LPF (ref 0–8)
COLOR: ABNORMAL
COMMENT UA: ABNORMAL
CRYSTALS, UA: ABNORMAL /HPF
EPITHELIAL CELLS UA: ABNORMAL /HPF (ref 0–5)
GLUCOSE URINE: NEGATIVE
KETONES, URINE: ABNORMAL
LEUKOCYTE ESTERASE, URINE: ABNORMAL
MUCUS: ABNORMAL
NITRITE, URINE: NEGATIVE
OTHER OBSERVATIONS UA: ABNORMAL
PH UA: 5 (ref 5–8)
PROTEIN UA: ABNORMAL
RBC UA: ABNORMAL /HPF (ref 0–4)
RENAL EPITHELIAL, UA: ABNORMAL /HPF
SPECIFIC GRAVITY UA: 1.02 (ref 1–1.03)
TRICHOMONAS: ABNORMAL
TURBIDITY: ABNORMAL
URINE HGB: ABNORMAL
UROBILINOGEN, URINE: NORMAL
WBC UA: ABNORMAL /HPF (ref 0–5)
YEAST: ABNORMAL

## 2020-05-15 LAB
CULTURE: ABNORMAL
Lab: ABNORMAL
SPECIMEN DESCRIPTION: ABNORMAL

## (undated) DEVICE — BASIN EMSIS 700ML GRAPHITE PLAS KID SHP GRAD

## (undated) DEVICE — TRAP SURG QUAD PARABOLA SLOT DSGN SFTY SCRN TRAPEASE

## (undated) DEVICE — CUP MED 1OZ CLR POLYPR FEED GRAD W/O LID

## (undated) DEVICE — 9165 UNIVERSAL PATIENT PLATE: Brand: 3M™

## (undated) DEVICE — SNARE ENDOSCP M L240CM LOOP W27MM SHTH DIA2.4MM OVL FLX

## (undated) DEVICE — SYRINGE, LUER SLIP, STERILE, 60ML: Brand: MEDLINE

## (undated) DEVICE — GAUZE,SPONGE,4"X4",16PLY,STRL,LF,10/TRAY: Brand: MEDLINE

## (undated) DEVICE — FORCEPS BX L240CM WRK CHN 2.8MM STD CAP W/ NDL MIC MESH

## (undated) DEVICE — CO2 CANNULA,SUPERSOFT, ADLT,7'O2,7'CO2: Brand: MEDLINE

## (undated) DEVICE — JELLY,LUBE,STERILE,FLIP TOP,TUBE,2-OZ: Brand: MEDLINE